# Patient Record
Sex: FEMALE | Race: WHITE | NOT HISPANIC OR LATINO | Employment: OTHER | URBAN - NONMETROPOLITAN AREA
[De-identification: names, ages, dates, MRNs, and addresses within clinical notes are randomized per-mention and may not be internally consistent; named-entity substitution may affect disease eponyms.]

---

## 2023-01-26 ENCOUNTER — HOSPITAL ENCOUNTER (EMERGENCY)
Facility: HOSPITAL | Age: 68
Discharge: HOME | End: 2023-01-26
Attending: EMERGENCY MEDICINE

## 2023-01-26 ENCOUNTER — APPOINTMENT (EMERGENCY)
Dept: RADIOLOGY | Facility: HOSPITAL | Age: 68
End: 2023-01-26

## 2023-01-26 VITALS
HEART RATE: 68 BPM | SYSTOLIC BLOOD PRESSURE: 195 MMHG | WEIGHT: 180.2 LBS | OXYGEN SATURATION: 98 % | TEMPERATURE: 98.3 F | RESPIRATION RATE: 17 BRPM | DIASTOLIC BLOOD PRESSURE: 107 MMHG

## 2023-01-26 DIAGNOSIS — M79.604 RIGHT LEG PAIN: Primary | ICD-10-CM

## 2023-01-26 RX ORDER — ENOXAPARIN SODIUM 100 MG/ML
1 INJECTION SUBCUTANEOUS ONCE
Status: COMPLETED | OUTPATIENT
Start: 2023-01-26 | End: 2023-01-26

## 2023-01-26 RX ORDER — IBUPROFEN 400 MG/1
400 TABLET ORAL ONCE
Status: COMPLETED | OUTPATIENT
Start: 2023-01-26 | End: 2023-01-26

## 2023-01-26 RX ADMIN — ENOXAPARIN SODIUM 80 MG: 80 INJECTION SUBCUTANEOUS at 17:37

## 2023-01-26 RX ADMIN — IBUPROFEN 400 MG: 400 TABLET, FILM COATED ORAL at 16:49

## 2023-01-26 NOTE — DISCHARGE INSTRUCTIONS
Please call [address] as soon as possible to arrange [phone]    [phone] toll free    Monday to Friday: 7 am to 7 pm  Saturday: 8 am to 12 pm    [address] Vascular Department [phone]    Return immediately if worse or any new symptoms  Tylenol 1000 mg every 6 hours as needed  and/or  Advil 400 mg every 6 hours as needed  May take both together   Radiologist will review your X-Rays

## 2023-01-27 ENCOUNTER — HOSPITAL ENCOUNTER (OUTPATIENT)
Dept: NON INVASIVE DIAGNOSTICS | Facility: HOSPITAL | Age: 68
Discharge: HOME | End: 2023-01-27
Attending: EMERGENCY MEDICINE

## 2023-01-27 DIAGNOSIS — M79.604 RIGHT LEG PAIN: ICD-10-CM

## 2023-01-27 NOTE — ED PROVIDER NOTES
History  Chief Complaint   Patient presents with   • Leg Pain     Her right anterior lower leg aches, took aleve with relieve but states pain came back when the aleve wore off       26-year-old female with granddaughter describes right leg discomfort, [facility] and [facility] department for vascular study  Patient describes subacute onset ache recently and intermittent back pain  No fever or chills  No chest pain or dyspnea  No hemoptysis  No prior history of VTE  She denies numbness and weakness  History provided by:  Patient  Leg Pain  Location:  Leg  Leg location:  R leg  Pain details:     Quality:  Aching    Radiates to:  Does not radiate    Severity:  Mild    Onset quality:  Gradual    Timing:  Constant    Progression:  Unchanged  Chronicity:  New  Prior injury to area:  No  Relieved by:  Nothing  Worsened by: Activity  Ineffective treatments: Over-the-counter medications  Associated symptoms: back pain    Associated symptoms: no fever, no numbness, no swelling and no tingling        None       Past Medical History:   Diagnosis Date   • Hypercholesterolemia    • Hypertension        Past Surgical History:   Procedure Laterality Date   • APPENDECTOMY         History reviewed  No pertinent family history  I have reviewed and agree with the history as documented  E-Cigarette/Vaping   • E-Cigarette Use Never User      E-Cigarette/Vaping Substances     Social History     Tobacco Use   • Smoking status: Never   • Smokeless tobacco: Never   Vaping Use   • Vaping Use: Never used   Substance Use Topics   • Alcohol use: Yes     Comment: occaional   • Drug use: Never       Review of Systems   Constitutional: Negative for fever  Musculoskeletal: Positive for back pain  All other systems reviewed and are negative  Physical Exam  Physical Exam  Vitals and nursing note reviewed     Constitutional:       Comments: Pleasant, comfortable-appearing   HENT:      Head: Normocephalic and atraumatic  Mouth/Throat:      Mouth: Mucous membranes are moist       Pharynx: Oropharynx is clear  Eyes:      Conjunctiva/sclera: Conjunctivae normal       Pupils: Pupils are equal, round, and reactive to light  Cardiovascular:      Rate and Rhythm: Normal rate and regular rhythm  Heart sounds: Normal heart sounds  Pulmonary:      Effort: Pulmonary effort is normal       Breath sounds: Normal breath sounds  Abdominal:      General: Bowel sounds are normal  There is no distension  Palpations: Abdomen is soft  Tenderness: There is no abdominal tenderness  Musculoskeletal:         General: No deformity  Cervical back: Neck supple  Comments: Right lower extremity 0 5-1 cm larger than left, no erythema, compartments soft, intact dorsal pedal pulses 2+ at bilateral lower extremities, no popliteal mass at right lower extremity tenderness  No bilateral medial thigh tenderness  Right lower extremity intact range of motion and strength and sensation, no bony deformity or obvious tenderness   Skin:     General: Skin is warm and dry  Neurological:      General: No focal deficit present  Mental Status: She is alert and oriented to person, place, and time  Cranial Nerves: No cranial nerve deficit  Coordination: Coordination normal    Psychiatric:         Behavior: Behavior normal          Thought Content:  Thought content normal          Judgment: Judgment normal          Vital Signs  ED Triage Vitals [01/26/23 1601]   Temperature Pulse Respirations Blood Pressure SpO2   98 3 °F (36 8 °C) 88 16 (!) 181/106 98 %      Temp Source Heart Rate Source Patient Position - Orthostatic VS BP Location FiO2 (%)   Oral Monitor Sitting Left arm --      Pain Score       6           Vitals:    01/26/23 1601 01/26/23 1744   BP: (!) 181/106 (!) 195/107   Pulse: 88 68   Patient Position - Orthostatic VS: Sitting Sitting         Visual Acuity      ED Medications  Medications   ibuprofen (MOTRIN) tablet 400 mg (400 mg Oral Given 1/26/23 1649)   enoxaparin (LOVENOX) subcutaneous injection 80 mg (80 mg Subcutaneous Given 1/26/23 1737)       Diagnostic Studies  Results Reviewed     None                 XR tibia fibula 2 views RIGHT   ED Interpretation by Kary Rutledge DO (01/26 1731)   No fracture, questionable foreign body or vessel cross-section mid anterior      Final Result by Jobie Schwab, MD (01/27 0858)      3 mm density within the soft tissues anterior to the tibia  Clinical correlation is advised  Workstation performed: [id number]         VAS lower limb venous duplex study, unilateral/limited    (Results Pending)              Procedures  Procedures         ED Course  ED Course as of 01/27/23 1024   Thu Jan 26, 2023 1739 Discussed x-rays-no history of foreign body, but made aware and outpatient vascular study tomorrow as well as prophylactic dose of enoxaparin, voices good understanding and agreeable with plan, family present and supportive                                             Medical Decision Making  43-year-old female referred to emergency department by her clinician for evaluation of right lower extremity for DVT but unavailable at this timeframe, x-rays reveal no fracture, questionable subcutaneous foreign body that patient is unaware and not clinically evident  Clinically her symptoms may be related to some intermittent right low back pain and will require further evaluation by her clinician, she is agreeable  She is also agreeable to dose of enoxaparin, blood thinner, and will return for vascular study following day    Right leg pain: complicated acute illness or injury  Amount and/or Complexity of Data Reviewed  Radiology: ordered and independent interpretation performed  Decision-making details documented in ED Course  ECG/medicine tests: ordered  Risk  Prescription drug management            Disposition  Final diagnoses:   Right leg pain     Time reflects when diagnosis was documented in both MDM as applicable and the Disposition within this note     Time User Action Codes Description Comment    1/26/2023  5:32 PM Lexi Rios Add [G22 465] Right leg pain       ED Disposition     ED Disposition   Discharge    Condition   Stable    Date/Time   Thu Jan 26, 2023  5:32 PM    [address] discharge to home/self care  Follow-up Information     Follow up With Specialties Details Why Contact Info Additional Information    [address] [facility] Schedule an appointment as soon as possible for a visit in 1 week  [facility] [phone] [facility] Family Medicine Schedule an appointment as soon as possible for a visit in 1 week  [phone] [facility], [address] [place] [address], [facility]          There are no discharge medications for this patient  Outpatient Discharge Orders   Ambulatory Referral to Family Practice   Standing Status: Future Standing Exp  Date: 01/26/24      VAS lower limb venous duplex study, unilateral/limited   Standing Status: Future Standing Exp   Date: 01/26/27       PDMP Review     None          ED Provider  Electronically Signed by           Mose Shone, DO  01/27/23 1024